# Patient Record
Sex: MALE | Race: WHITE
[De-identification: names, ages, dates, MRNs, and addresses within clinical notes are randomized per-mention and may not be internally consistent; named-entity substitution may affect disease eponyms.]

---

## 2019-10-04 ENCOUNTER — HOSPITAL ENCOUNTER (EMERGENCY)
Dept: HOSPITAL 4 - SED | Age: 49
Discharge: HOME | End: 2019-10-04
Payer: COMMERCIAL

## 2019-10-04 VITALS — SYSTOLIC BLOOD PRESSURE: 136 MMHG

## 2019-10-04 VITALS — SYSTOLIC BLOOD PRESSURE: 132 MMHG

## 2019-10-04 VITALS — HEIGHT: 66 IN | WEIGHT: 189 LBS | BODY MASS INDEX: 30.37 KG/M2
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
## 2019-10-04 DIAGNOSIS — S61.011A: Primary | ICD-10-CM
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
## 2019-10-04 NOTE — NUR
Patient given written and verbal discharge instructions and verbalizes 
understanding.  ER MD discussed with patient the results and treatment 
provided. Patient in stable condition. ID arm band removed. 

Rx of Ibuprofen and Keflex  given. Patient educated on pain management and to 
follow up with PMD. Pain Scale 2/10 tolerable for patient.

Opportunity for questions provided and answered. Medication side effect fact 
sheet provided.

## 2019-10-04 NOTE — NUR
Pt brought by  self , A&Ox4, pt presents to ER with LAC on R thumb after 
cutting it with a razor while working as a [employer], pt is afebrile.